# Patient Record
Sex: FEMALE | Race: WHITE | Employment: FULL TIME | URBAN - METROPOLITAN AREA
[De-identification: names, ages, dates, MRNs, and addresses within clinical notes are randomized per-mention and may not be internally consistent; named-entity substitution may affect disease eponyms.]

---

## 2017-03-13 ENCOUNTER — HOSPITAL ENCOUNTER (OUTPATIENT)
Dept: WOMENS IMAGING | Age: 52
Discharge: HOME | End: 2017-03-13
Payer: COMMERCIAL

## 2017-03-13 DIAGNOSIS — Z12.39 BREAST SCREENING: ICD-10-CM

## 2017-03-13 PROCEDURE — 77063 BREAST TOMOSYNTHESIS BI: CPT

## 2018-03-12 ENCOUNTER — HOSPITAL ENCOUNTER (OUTPATIENT)
Dept: WOMENS IMAGING | Age: 53
Discharge: HOME | End: 2018-03-14
Payer: COMMERCIAL

## 2018-03-12 DIAGNOSIS — Z12.31 VISIT FOR SCREENING MAMMOGRAM: ICD-10-CM

## 2018-03-12 PROCEDURE — 77063 BREAST TOMOSYNTHESIS BI: CPT

## 2019-04-15 ENCOUNTER — HOSPITAL ENCOUNTER (OUTPATIENT)
Dept: WOMENS IMAGING | Age: 54
Discharge: HOME | End: 2019-04-17
Payer: COMMERCIAL

## 2019-04-15 DIAGNOSIS — Z78.0 MENOPAUSE: ICD-10-CM

## 2019-04-15 DIAGNOSIS — Z12.31 VISIT FOR SCREENING MAMMOGRAM: ICD-10-CM

## 2019-04-15 PROCEDURE — 77063 BREAST TOMOSYNTHESIS BI: CPT

## 2019-04-15 PROCEDURE — 77080 DXA BONE DENSITY AXIAL: CPT

## 2020-03-06 ENCOUNTER — APPOINTMENT (OUTPATIENT)
Dept: GENERAL RADIOLOGY | Age: 55
End: 2020-03-06
Payer: COMMERCIAL

## 2020-03-06 ENCOUNTER — HOSPITAL ENCOUNTER (EMERGENCY)
Age: 55
Discharge: HOME | End: 2020-03-06
Attending: EMERGENCY MEDICINE
Payer: COMMERCIAL

## 2020-03-06 VITALS
RESPIRATION RATE: 16 BRPM | OXYGEN SATURATION: 98 % | HEART RATE: 85 BPM | WEIGHT: 270 LBS | DIASTOLIC BLOOD PRESSURE: 103 MMHG | BODY MASS INDEX: 40.92 KG/M2 | TEMPERATURE: 98.9 F | HEIGHT: 68 IN | SYSTOLIC BLOOD PRESSURE: 189 MMHG

## 2020-03-06 PROCEDURE — 73610 X-RAY EXAM OF ANKLE: CPT

## 2020-03-06 PROCEDURE — 99284 EMERGENCY DEPT VISIT MOD MDM: CPT

## 2020-03-06 PROCEDURE — 6370000000 HC RX 637 (ALT 250 FOR IP): Performed by: PHYSICIAN ASSISTANT

## 2020-03-06 RX ORDER — IBUPROFEN 600 MG/1
600 TABLET ORAL ONCE
Status: COMPLETED | OUTPATIENT
Start: 2020-03-06 | End: 2020-03-06

## 2020-03-06 RX ORDER — HYDROCODONE BITARTRATE AND ACETAMINOPHEN 5; 325 MG/1; MG/1
1 TABLET ORAL ONCE
Status: DISCONTINUED | OUTPATIENT
Start: 2020-03-06 | End: 2020-03-06 | Stop reason: HOSPADM

## 2020-03-06 RX ADMIN — IBUPROFEN 600 MG: 600 TABLET, FILM COATED ORAL at 16:00

## 2020-03-06 ASSESSMENT — PAIN DESCRIPTION - LOCATION
LOCATION: ANKLE
LOCATION: ANKLE

## 2020-03-06 ASSESSMENT — PAIN SCALES - GENERAL
PAINLEVEL_OUTOF10: 3
PAINLEVEL_OUTOF10: 2
PAINLEVEL_OUTOF10: 1

## 2020-03-06 ASSESSMENT — PAIN DESCRIPTION - PAIN TYPE
TYPE: ACUTE PAIN
TYPE: ACUTE PAIN

## 2020-03-06 ASSESSMENT — PAIN DESCRIPTION - ORIENTATION
ORIENTATION: RIGHT;LEFT
ORIENTATION: RIGHT;LEFT

## 2020-03-09 NOTE — ED PROVIDER NOTES
eMERGENCY dEPARTMENT eNCOUnter   [address] Name: Chris Petersen  MRN: [id number]  [place] 1965  Date of evaluation: 3/9/20     Chris Petersen is a 47 y.o. female with CC: Fall and Ankle Pain (bilateral)      Based on the medical record the care appears appropriate. I was personally available for consultation in the Emergency Department.     Edson Strauss MD  Attending Emergency Physician                    Maryjo Chaves MD  03/09/20 [id number]

## 2020-03-10 NOTE — ED PROVIDER NOTES
[address] ED  eMERGENCY dEPARTMENT eNCOUnter      Pt Name: Alyson Sparks  MRN: [id number]  [place] 1965  Date of evaluation: 3/10/20      CHIEF COMPLAINT       Chief Complaint   Patient presents with    Fall    Ankle Pain     bilateral         HISTORY OF PRESENT ILLNESS    Alyson Sparks is a 47 y.o. female who presents complaining of bilateral ankle pain, fell. The history is provided by the patient. Ankle Problem   Location:  Ankle  Time since incident:  1 hour  Injury: yes    Mechanism of injury comment:  Missed a step right aankle pain greater than left. pain to lateral aspect bilateral ankles  Ankle location:  L ankle and R ankle  Pain details:     Quality:  Aching    Radiates to:  Does not radiate    Severity:  Mild    Onset quality:  Sudden    Duration:  1 hour    Timing:  Intermittent    Progression:  Waxing and waning  Chronicity:  New  Dislocation: no    Foreign body present:  No foreign bodies  Tetanus status:  Unknown  Relieved by: Movement and rest  Worsened by: Activity  Ineffective treatments:  None tried  Associated symptoms: decreased ROM    Associated symptoms: no neck pain, no numbness and no stiffness        REVIEW OF SYSTEMS       Review of Systems   Musculoskeletal: Positive for arthralgias (bilateral ankle pain missed a step and inverted ankles, right greatre than left). Negative for neck pain and stiffness. All other systems reviewed and are negative. PAST MEDICAL HISTORY   History reviewed. No pertinent past medical history. SURGICAL HISTORY     History reviewed. No pertinent surgical history. CURRENT MEDICATIONS     There are no discharge medications for this patient. ALLERGIES     is allergic to sulfa antibiotics. FAMILY HISTORY     has no family status information on file. SOCIAL HISTORY      reports that she has never smoked. She has never used smokeless tobacco. She reports current alcohol use.  She reports that she does not use

## 2020-10-16 ENCOUNTER — HOSPITAL ENCOUNTER (OUTPATIENT)
Dept: WOMENS IMAGING | Age: 55
Discharge: HOME | End: 2020-10-18
Payer: COMMERCIAL

## 2020-10-16 PROCEDURE — 77063 BREAST TOMOSYNTHESIS BI: CPT

## 2021-10-18 ENCOUNTER — HOSPITAL ENCOUNTER (OUTPATIENT)
Dept: WOMENS IMAGING | Age: 56
Discharge: HOME | End: 2021-10-20
Payer: COMMERCIAL

## 2021-10-18 DIAGNOSIS — Z12.39 SCREENING BREAST EXAMINATION: ICD-10-CM

## 2021-10-18 DIAGNOSIS — M85.80 OSTEOPENIA, UNSPECIFIED LOCATION: ICD-10-CM

## 2021-10-18 PROCEDURE — 77063 BREAST TOMOSYNTHESIS BI: CPT

## 2021-10-18 PROCEDURE — 77080 DXA BONE DENSITY AXIAL: CPT

## 2022-10-19 ENCOUNTER — HOSPITAL ENCOUNTER (OUTPATIENT)
Dept: WOMENS IMAGING | Age: 57
Discharge: HOME | End: 2022-10-21
Payer: COMMERCIAL

## 2022-10-19 DIAGNOSIS — Z12.31 ENCOUNTER FOR SCREENING MAMMOGRAM FOR MALIGNANT NEOPLASM OF BREAST: ICD-10-CM

## 2022-10-19 PROCEDURE — 77063 BREAST TOMOSYNTHESIS BI: CPT

## 2023-10-23 ENCOUNTER — HOSPITAL ENCOUNTER (OUTPATIENT)
Dept: WOMENS IMAGING | Age: 58
Discharge: HOME | End: 2023-10-25
Payer: COMMERCIAL

## 2023-10-23 VITALS — HEIGHT: 69 IN | WEIGHT: 245 LBS | BODY MASS INDEX: 36.29 KG/M2

## 2023-10-23 DIAGNOSIS — Z12.31 VISIT FOR SCREENING MAMMOGRAM: ICD-10-CM

## 2023-10-23 PROCEDURE — 77067 SCR MAMMO BI INCL CAD: CPT

## 2024-11-04 ENCOUNTER — HOSPITAL ENCOUNTER (OUTPATIENT)
Dept: WOMENS IMAGING | Age: 59
Discharge: HOME | End: 2024-11-06
Payer: COMMERCIAL

## 2024-11-04 VITALS — HEIGHT: 69 IN | BODY MASS INDEX: 36.29 KG/M2 | WEIGHT: 245 LBS

## 2024-11-04 DIAGNOSIS — Z12.39 ENCOUNTER FOR OTHER SCREENING FOR MALIGNANT NEOPLASM OF BREAST: ICD-10-CM

## 2024-11-04 PROCEDURE — 77063 BREAST TOMOSYNTHESIS BI: CPT

## 2025-08-28 ENCOUNTER — TRANSCRIBE ORDERS (OUTPATIENT)
Dept: ADMINISTRATIVE | Age: 60
End: 2025-08-28

## 2025-08-28 DIAGNOSIS — Z12.31 ENCOUNTER FOR SCREENING MAMMOGRAM FOR MALIGNANT NEOPLASM OF BREAST: Primary | ICD-10-CM
